# Patient Record
Sex: FEMALE | URBAN - METROPOLITAN AREA
[De-identification: names, ages, dates, MRNs, and addresses within clinical notes are randomized per-mention and may not be internally consistent; named-entity substitution may affect disease eponyms.]

---

## 2019-11-25 ENCOUNTER — APPOINTMENT (RX ONLY)
Dept: URBAN - METROPOLITAN AREA CLINIC 95 | Facility: CLINIC | Age: 59
Setting detail: DERMATOLOGY
End: 2019-11-25

## 2019-11-25 DIAGNOSIS — Z41.9 ENCOUNTER FOR PROCEDURE FOR PURPOSES OTHER THAN REMEDYING HEALTH STATE, UNSPECIFIED: ICD-10-CM

## 2019-11-25 PROBLEM — F32.9 MAJOR DEPRESSIVE DISORDER, SINGLE EPISODE, UNSPECIFIED: Status: ACTIVE | Noted: 2019-11-25

## 2019-11-25 PROCEDURE — ? TREATMENT REGIMEN

## 2019-11-25 PROCEDURE — ? LUMENIS M22

## 2019-11-25 ASSESSMENT — LOCATION DETAILED DESCRIPTION DERM
LOCATION DETAILED: NASAL DORSUM
LOCATION DETAILED: RIGHT INFERIOR CENTRAL MALAR CHEEK
LOCATION DETAILED: RIGHT INFERIOR LATERAL MALAR CHEEK
LOCATION DETAILED: LEFT INFERIOR CENTRAL MALAR CHEEK
LOCATION DETAILED: RIGHT CHIN
LOCATION DETAILED: RIGHT MEDIAL FOREHEAD
LOCATION DETAILED: LEFT CHIN
LOCATION DETAILED: INFERIOR MID FOREHEAD

## 2019-11-25 ASSESSMENT — LOCATION SIMPLE DESCRIPTION DERM
LOCATION SIMPLE: CHIN
LOCATION SIMPLE: RIGHT FOREHEAD
LOCATION SIMPLE: INFERIOR FOREHEAD
LOCATION SIMPLE: RIGHT CHEEK
LOCATION SIMPLE: LEFT CHEEK
LOCATION SIMPLE: NOSE

## 2019-11-25 ASSESSMENT — LOCATION ZONE DERM
LOCATION ZONE: NOSE
LOCATION ZONE: FACE

## 2019-11-25 NOTE — PROCEDURE: LUMENIS M22
Pulse Duration (In Milliseconds): 4
Treatment Number: 1
Treated Area: small area
Consent: Written consent obtained, risks reviewed including but not limited to crusting, scabbing, blistering, scarring, darker or lighter pigmentary change, bruising, and/or incomplete response.
Fluence: 16
Total Pulses: 105
Procedure Text: The patient's skin was cleaned and the procedure was performed using the parameters noted above.
Skin Type (Optional): III
Fluence Units: J/cm2
Price (Use Numbers Only, No Special Characters Or $): 656
Topical Anesthesia?: BLT cream (benzocaine 20%, lidocaine 6%, tetracaine 4%)
Post-Care Instructions: I reviewed with the patient in detail post-care instructions. Patient should stay away from the sun and wear sun protection until treated areas are fully healed.
Device Serial Number (Optional): [id number]
Length Of Topical Anesthesia Application (Optional): 30 minutes
External Cooling Fan Speed: 0
Detail Level: Zone
Pulse Delay (In Milliseconds): 20
Fluence: 15
Device Serial Number (Optional): [id number]
Total Pulses: 49